# Patient Record
Sex: FEMALE | URBAN - METROPOLITAN AREA
[De-identification: names, ages, dates, MRNs, and addresses within clinical notes are randomized per-mention and may not be internally consistent; named-entity substitution may affect disease eponyms.]

---

## 2022-09-02 ENCOUNTER — OFFICE VISIT (OUTPATIENT)
Dept: URBAN - METROPOLITAN AREA CLINIC 72 | Facility: CLINIC | Age: 67
End: 2022-09-02
Payer: MEDICARE

## 2022-09-02 DIAGNOSIS — H04.123 DRY EYE SYNDROME OF BILATERAL LACRIMAL GLANDS: ICD-10-CM

## 2022-09-02 DIAGNOSIS — H25.812 COMBINED FORMS OF AGE-RELATED CATARACT, LEFT EYE: ICD-10-CM

## 2022-09-02 DIAGNOSIS — H43.813 VITREOUS DEGENERATION, BILATERAL: Primary | ICD-10-CM

## 2022-09-02 PROCEDURE — 99203 OFFICE O/P NEW LOW 30 MIN: CPT | Performed by: OPTOMETRIST

## 2022-09-02 ASSESSMENT — INTRAOCULAR PRESSURE
OD: 12
OS: 14

## 2022-09-02 NOTE — IMPRESSION/PLAN
Impression: Combined forms of age-related cataract, left eye: H25.812. Plan: Discussed signs and symptoms of cataract progression. Pt to RTC PRN in the event of changes to visual status. No recommendation for surgery at present time. Will continue to monitor.

## 2022-09-02 NOTE — IMPRESSION/PLAN
Impression: Dry eye syndrome of bilateral lacrimal glands: H04.123. Plan: Educated pt on Dry eye and the affects it can have on the [place] Recommend OTC artifical tear use 2-4x daily w/ emphasis on QAM and QHS doses. Discussed possible improvement with thicker gel or ointment QHS. Recommend warm compress w/ lid massage. If symptoms continue/worsen will consider prescription drug therapy.

## 2024-01-09 ENCOUNTER — OFFICE VISIT (OUTPATIENT)
Dept: URBAN - METROPOLITAN AREA CLINIC 71 | Facility: CLINIC | Age: 69
End: 2024-01-09
Payer: MEDICARE

## 2024-01-09 DIAGNOSIS — H00.021 HORDEOLUM INTERNUM RIGHT UPPER EYELID: Primary | ICD-10-CM

## 2024-01-09 PROCEDURE — 99212 OFFICE O/P EST SF 10 MIN: CPT | Performed by: OPTOMETRIST

## 2024-01-09 ASSESSMENT — INTRAOCULAR PRESSURE
OD: 9
OS: 16